# Patient Record
Sex: MALE
[De-identification: names, ages, dates, MRNs, and addresses within clinical notes are randomized per-mention and may not be internally consistent; named-entity substitution may affect disease eponyms.]

---

## 2017-07-11 PROBLEM — Z00.00 ENCOUNTER FOR PREVENTIVE HEALTH EXAMINATION: Status: ACTIVE | Noted: 2017-07-11

## 2017-08-02 ENCOUNTER — APPOINTMENT (OUTPATIENT)
Dept: GASTROENTEROLOGY | Facility: CLINIC | Age: 37
End: 2017-08-02

## 2018-01-19 ENCOUNTER — TRANSCRIPTION ENCOUNTER (OUTPATIENT)
Age: 38
End: 2018-01-19

## 2019-12-28 ENCOUNTER — OFFICE VISIT (OUTPATIENT)
Dept: SPORTS MEDICINE | Facility: CLINIC | Age: 39
End: 2019-12-28
Payer: COMMERCIAL

## 2019-12-28 VITALS
BODY MASS INDEX: 23.62 KG/M2 | SYSTOLIC BLOOD PRESSURE: 117 MMHG | WEIGHT: 190 LBS | DIASTOLIC BLOOD PRESSURE: 77 MMHG | HEIGHT: 75 IN

## 2019-12-28 DIAGNOSIS — M99.05 SOMATIC DYSFUNCTION OF PELVIC REGION: ICD-10-CM

## 2019-12-28 DIAGNOSIS — M54.59 MECHANICAL LOW BACK PAIN: Primary | ICD-10-CM

## 2019-12-28 DIAGNOSIS — M79.10 MYALGIA: ICD-10-CM

## 2019-12-28 DIAGNOSIS — M99.06 SOMATIC DYSFUNCTION OF LOWER EXTREMITY: ICD-10-CM

## 2019-12-28 DIAGNOSIS — M99.04 SACRAL REGION SOMATIC DYSFUNCTION: ICD-10-CM

## 2019-12-28 DIAGNOSIS — M99.03 SOMATIC DYSFUNCTION OF LUMBAR REGION: ICD-10-CM

## 2019-12-28 DIAGNOSIS — R20.2 PARESTHESIA OF BOTH LOWER EXTREMITIES: ICD-10-CM

## 2019-12-28 DIAGNOSIS — M99.02 SOMATIC DYSFUNCTION OF THORACIC REGION: ICD-10-CM

## 2019-12-28 PROCEDURE — 99204 PR OFFICE/OUTPT VISIT, NEW, LEVL IV, 45-59 MIN: ICD-10-PCS | Mod: 25,S$GLB,, | Performed by: NEUROMUSCULOSKELETAL MEDICINE & OMM

## 2019-12-28 PROCEDURE — 97110 THERAPEUTIC EXERCISES: CPT | Mod: S$GLB,,, | Performed by: NEUROMUSCULOSKELETAL MEDICINE & OMM

## 2019-12-28 PROCEDURE — 98927 PR OSTEOPATHIC MANIP,5-6 BODY REGN: ICD-10-PCS | Mod: S$GLB,,, | Performed by: NEUROMUSCULOSKELETAL MEDICINE & OMM

## 2019-12-28 PROCEDURE — 99999 PR PBB SHADOW E&M-NEW PATIENT-LVL II: CPT | Mod: PBBFAC,,, | Performed by: NEUROMUSCULOSKELETAL MEDICINE & OMM

## 2019-12-28 PROCEDURE — 99999 PR PBB SHADOW E&M-NEW PATIENT-LVL II: ICD-10-PCS | Mod: PBBFAC,,, | Performed by: NEUROMUSCULOSKELETAL MEDICINE & OMM

## 2019-12-28 PROCEDURE — 97110 PR THERAPEUTIC EXERCISES: ICD-10-PCS | Mod: S$GLB,,, | Performed by: NEUROMUSCULOSKELETAL MEDICINE & OMM

## 2019-12-28 PROCEDURE — 99204 OFFICE O/P NEW MOD 45 MIN: CPT | Mod: 25,S$GLB,, | Performed by: NEUROMUSCULOSKELETAL MEDICINE & OMM

## 2019-12-28 PROCEDURE — 98927 OSTEOPATH MANJ 5-6 REGIONS: CPT | Mod: S$GLB,,, | Performed by: NEUROMUSCULOSKELETAL MEDICINE & OMM

## 2019-12-28 NOTE — PROGRESS NOTES
Subjective:     Stephon Jackson     Chief Complaint   Patient presents with    Spine - Pain       HPI    Stephon is a 39 y.o. male coming in today for low back pain that began 10 day(s) ago, referred by self. Pt. describes the pain as a 3/10 achy pain that does radiate. There was not a fall/injury/ or trauma associated with the onset of symptoms. Pt. was attending a group exercise class and tried to jump in to difficult exercises. Pt then reports he was picking up grocey bags and and increased pain over left side lower back. He has a history of 2 herniated disks in 2015 and has been to PT in the pat with some relief. The pain is better with PT, rest and worse with sitting, driving.  Patient also notes having a EMG in 2017 noting a possible polyneuropathy, but this has been stable without any progression of symptoms. Patient notes intermittent tingling in his lower extremities, but none currently.  Pt. Denies any other musculoskeletal complaints at this time.      Joint instability? no  Mechanical locking/clicking? no   Affecting ADL's? yes  Affecting sleep? no      Review of Systems   Constitutional: Negative for chills and fever.   HENT: Negative for hearing loss and tinnitus.    Eyes: Negative for blurred vision and photophobia.   Respiratory: Negative for cough and shortness of breath.    Cardiovascular: Negative for chest pain and leg swelling.   Gastrointestinal: Negative for abdominal pain, heartburn, nausea and vomiting.   Genitourinary: Negative for dysuria and hematuria.   Musculoskeletal: Negative for back pain, falls, joint pain, myalgias and neck pain.   Skin: Negative for rash.   Neurological: Positive for tingling. Negative for dizziness, focal weakness, weakness and headaches.   Endo/Heme/Allergies: Negative for environmental allergies. Does not bruise/bleed easily.   Psychiatric/Behavioral: Negative for depression. The patient is not nervous/anxious.        PAST MEDICAL HISTORY:   Past Medical  History:   Diagnosis Date    Crohn's disease     in remission since 2004    Paresthesia      PAST SURGICAL HISTORY: History reviewed. No pertinent surgical history.  FAMILY HISTORY: History reviewed. No pertinent family history.  SOCIAL HISTORY:   Social History     Socioeconomic History    Marital status: Single     Spouse name: Not on file    Number of children: Not on file    Years of education: Not on file    Highest education level: Not on file   Occupational History    Not on file   Social Needs    Financial resource strain: Not on file    Food insecurity:     Worry: Not on file     Inability: Not on file    Transportation needs:     Medical: Not on file     Non-medical: Not on file   Tobacco Use    Smoking status: Not on file   Substance and Sexual Activity    Alcohol use: Not on file    Drug use: Not on file    Sexual activity: Not on file   Lifestyle    Physical activity:     Days per week: Not on file     Minutes per session: Not on file    Stress: Not on file   Relationships    Social connections:     Talks on phone: Not on file     Gets together: Not on file     Attends [employer] service: Not on file     Active member of club or organization: Not on file     Attends meetings of clubs or organizations: Not on file     Relationship status: Not on file   Other Topics Concern    Not on file   Social History Narrative    Not on file       MEDICATIONS: No current outpatient medications on file.  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     Office note from Dr. Augustin (ortho spine surgery in [place], [place]) on 2/13/17 reviewed:  Noted previous treatment included acupuncture, chiropractic manipulation, and physical therapy.  Lumbar spine MRI from  "2/11/17 was noted to have L3-4 and L4-5 desiccation and disc space narrowing with a L3-4 bulge and midline annular tear. The L4-5 mild bulge appeared stable in comparison to September 2016 lumbar spine MRI, per Dr. Augustin's note.  No apparent nerve root compression or significant stenosis was appreciated. Physical therapy, EMG, and neurology consultation was ordered.     Objective:     VITAL SIGNS: /77 (BP Location: Right arm, Patient Position: Sitting, BP Method: Medium (Automatic))   Ht 6' 3" (1.905 m)   Wt 86.2 kg (190 lb)   BMI 23.75 kg/m²    General    Nursing note and vitals reviewed.  Constitutional: He is oriented to person, place, and time. He appears well-developed and well-nourished.   HENT:   Head: Normocephalic and atraumatic.   no nasal discharge, no external ear redness or discharge   Eyes:   EOM is full and smooth  No eye redness or discharge   Neck: Neck supple. No tracheal deviation present.   Cardiovascular: Normal rate.    2+ Radial pulse bilaterally  2+ Dorsalis Pedis pulse bilaterally  No LE edema appreciated   Pulmonary/Chest: Effort normal. No respiratory distress.   Abdominal: He exhibits no distension.   No rigidity   Neurological: He is alert and oriented to person, place, and time. He exhibits normal muscle tone. Coordination normal.   See details below   Psychiatric: He has a normal mood and affect. His behavior is normal.               MUSCULOSKELETAL EXAM  Lumbar Spine: bilateral lumbar region    Observation:    Normal cervicothoracolumbar curves.    No obvious pelvic obliquity while standing.    No edema, erythema, or ecchymosis noted in lumbosacral region.    No midline skin abnormalities.    No atrophy of lower limb musculature.  Leg lengths symmetric.  Posture:  Posterior pelvis tilt with loss of lumbar lordosis     Tenderness:  No tenderness throughout the lumbar spine, iliolumbar region, posterior pelvis.  No tenderness over the sacrum, piriformis, greater/lesser " trochanters.  No bony deformities or step-offs palpated.     Range of Motion (* = with pain):  Active flexion to 60°.  Active extension to 25°.   Active rotation to 30° on left and 30° on right.  Active sidebending to 25° on left and 25° on right.   Passive hip flexion to 135° on left and 135° on right.    Passive hip internal rotation to 45° on left and 45° on right.   Passive hip external rotation to 45° on left and 45° on right.   All ROM testing is without pain.    Strength Testing (* = with pain):  Hip flexion - 5/5 on left and 5/5 on right  Hip extension - 5/5 on left and 5/5 on right  Knee flexion - 5/5 on left and 5/5 on right  Knee extension - 5/5 on left and 5/5 on right  Dorsiflexion - 5/5 on left and 5/5 on right  Plantarflexion - 5/5 on left and 5/5 on right  Great toe extension - 5/5 on left and 5/5 on right    Special Tests:  Stork test - negative on left and negative on right  Resisted axial rotation - negative on left and negative on right    Seated straight leg raise - negative on left and negative on right  Supine straight leg raise - negative on left and negative on right   Slump test - negative on left and negative on right  Pheasant test - negative on left and negative on right  Provocation maneuvers exhibit no worsening of symptoms.    ELA test - negative  FADIR test - negative    Structural Exam:  TART (Tissue texture abnormality, Asymmetry,  Restriction of motion and/or Tenderness) changes:       Thoracic Spine   T1 Neutral   T2 Neutral   T3 Neutral   T4 Neutral   T5 Neutral   T6 Neutral   T7 Neutral   T8 Neutral   T9 Neutral   T10 Neutral   T11 Neutral   T12 NS-right,R-left     Rib cage: neutral    Abdomen: left hemidiaphragm TTA     Lumbar Spine   L1 NS-right,R-left   L2 NS-right,R-left   L3 NS-right,R-left   L4 Neutral   L5 FRS RIGHT       Pelvis:  · Innominate:Right anterior rotation  · Pubic bone:Right inferior pubic shear    Sacrum:Left on Right sacral torsion     Lower  Extremity:  · Leg lengths symmetric    Location/joint Finding/restriction   Fibular head Posterior on left   [place] Neutral   Talocrural joint Left   Subtalar Joint Neutral   Cuboid Neutral   Talo-navicular joint Neutral   Navicular-cuneiform joint Neutral       Key   F= Flexed   E = Extended   R = Rotated   S = Sidebent   TTA = tissue texture abnormality       Neurovascular Exam:  Reflexes +2/4 and symmetric at the L4 and S1 dermatomes.    Sensation intact to light touch in the L2-S2 dermatomes bilaterally.   Babinski response downgoing bilaterally.  No pretibial edema or abnormal hair pattern of the shin.    Intact and symmetric DP and PT pulses bilaterally.  Normal gait without trendelenberg, heel walking, toe walking, and tandem walking.      Assessment:      Encounter Diagnoses   Name Primary?    Mechanical low back pain Yes    Myalgia     Paresthesia of both lower extremities     Somatic dysfunction of thoracic region     Somatic dysfunction of lumbar region     Sacral region somatic dysfunction     Somatic dysfunction of pelvic region     Somatic dysfunction of lower extremity           Plan:   1.  Low back pain likely secondary to biomechanical restrictions of the lower kinetic chain and compensatory firing patterns.  No radicular symptoms on physical exam today.   - OMT performed today to address associated biomechanical restrictions  and HEP started.   - Recommend continuing activity as tolerated in current yoga practice, starting increased core strengthening with Pilates and personal training.  Information given to patient on [facility]  - Per notes of previous spinal orthopedic surgeon in [facility], [place] (Dr. Augustin) noted L3-4 and L4-5 disc degeneration from MRI on 2/11/17 without any clear nerve root compression or significant stenosis.     2. Underlying, intermittent lower extremity paresthesias with previous EMG in 2017 noting a possible multifocal demyelinating  polyneuropathy.  Plan for continued watchful waiting, but if symptoms progress recommend referral to Neurology for further evaluation.     3. OMT 5-6 regions. Oral consent obtained.  Reviewed benefits and potential side effects.   - OMT indicated today due to signs and symptoms as well as local and remote somatic dysfunction findings and their related neurokinetic, lymphatic, fascial and/or arteriovenous body connections.   - OMT techniques used: Myofascial Release, Muscle Energy and Articulatory   - Treatment was tolerated well. Improvement noted in segmental mobility post-treatment in dysfunctional regions. There were no adverse events and no complications immediately following treatment.     4. Pt. Given the following HEP:  A) Diaphragmatic breathing exercises: 10-15 reps of inhalation and exhalation, focusing inhaling into the abdomen and lower ribs. Repeat 2-3 times a day. Handout given.   B)  Pelvic clock exercises given to do from the 6-12 o'clock positions:10-15 reps, twice daily. Hand out of exercise also given.   C) Quadratus lumborum self-stretch on all fours: hold stretch for 30 seconds, repeating 2-3 times on each side. Do stretch twice daily. Hand-out also given.   D) IT band rolling: recommend using rolling stick or foam roller to roll out IT band tension bilaterally, 1-2 times a day.     [address]84 HOME EXERCISE PROGRAM (HEP):  The patient was taught a homegoing physical therapy regimen as described above. The patient demonstrated understanding of the exercises and proper technique of their execution. This interaction took 15 minutes.     5. Follow-up in 2-3 weeks for reevaluation    6. Patient agreeable to today's plan and all questions were answered    This note is dictated using the M*Modal Fluency Direct word recognition program. There are word recognition mistakes that are occasionally missed on review.

## 2020-01-13 ENCOUNTER — OFFICE VISIT (OUTPATIENT)
Dept: ORTHOPEDICS | Facility: CLINIC | Age: 40
End: 2020-01-13
Payer: COMMERCIAL

## 2020-01-13 VITALS
HEIGHT: 75 IN | SYSTOLIC BLOOD PRESSURE: 110 MMHG | WEIGHT: 190 LBS | BODY MASS INDEX: 23.62 KG/M2 | DIASTOLIC BLOOD PRESSURE: 70 MMHG

## 2020-01-13 DIAGNOSIS — R20.2 PARESTHESIA OF BOTH LOWER EXTREMITIES: ICD-10-CM

## 2020-01-13 DIAGNOSIS — M99.05 SOMATIC DYSFUNCTION OF PELVIC REGION: ICD-10-CM

## 2020-01-13 DIAGNOSIS — M99.03 SOMATIC DYSFUNCTION OF LUMBAR REGION: ICD-10-CM

## 2020-01-13 DIAGNOSIS — M79.10 MYALGIA: ICD-10-CM

## 2020-01-13 DIAGNOSIS — M99.06 SOMATIC DYSFUNCTION OF LOWER EXTREMITY: ICD-10-CM

## 2020-01-13 DIAGNOSIS — M54.59 MECHANICAL LOW BACK PAIN: Primary | ICD-10-CM

## 2020-01-13 DIAGNOSIS — M99.04 SACRAL REGION SOMATIC DYSFUNCTION: ICD-10-CM

## 2020-01-13 DIAGNOSIS — M99.02 SOMATIC DYSFUNCTION OF THORACIC REGION: ICD-10-CM

## 2020-01-13 PROCEDURE — 99999 PR PBB SHADOW E&M-EST. PATIENT-LVL II: CPT | Mod: PBBFAC,,, | Performed by: NEUROMUSCULOSKELETAL MEDICINE & OMM

## 2020-01-13 PROCEDURE — 99999 PR PBB SHADOW E&M-EST. PATIENT-LVL II: ICD-10-PCS | Mod: PBBFAC,,, | Performed by: NEUROMUSCULOSKELETAL MEDICINE & OMM

## 2020-01-13 PROCEDURE — 98927 OSTEOPATH MANJ 5-6 REGIONS: CPT | Mod: S$GLB,,, | Performed by: NEUROMUSCULOSKELETAL MEDICINE & OMM

## 2020-01-13 PROCEDURE — 99213 OFFICE O/P EST LOW 20 MIN: CPT | Mod: 25,S$GLB,, | Performed by: NEUROMUSCULOSKELETAL MEDICINE & OMM

## 2020-01-13 PROCEDURE — 98927 PR OSTEOPATHIC MANIP,5-6 BODY REGN: ICD-10-PCS | Mod: S$GLB,,, | Performed by: NEUROMUSCULOSKELETAL MEDICINE & OMM

## 2020-01-13 PROCEDURE — 99213 PR OFFICE/OUTPT VISIT, EST, LEVL III, 20-29 MIN: ICD-10-PCS | Mod: 25,S$GLB,, | Performed by: NEUROMUSCULOSKELETAL MEDICINE & OMM

## 2020-01-13 NOTE — PROGRESS NOTES
"Subjective:     Stephon Jackson     Chief Complaint   Patient presents with    Follow-up     Back pain       HPI    Stephon is a 39 y.o. male coming in today for low back pain. Since last visit the pain has Improved. He states he feels much better than 2 weeks ago. He had one session with [facility] which went well and denies any pain with this. The pain is better with HEP, OMT and worse with first thing in the morning. Pt. describes the pain as a 0/10 currently,  4/10 at worst stiff pain that does not radiate. He continues to c/o numbness in his left foot with working out and sitting with leg crossed. He notes stiffness in his left lower back. There has not been any new a fall/injury/ or traumas since last visit.  Pt. denies any new musculoskeletal complaints at this time.     Office note from 12/28/19 reviewed    Review of Systems   Constitutional: Negative for chills and fever.   Musculoskeletal: Negative for back pain, falls, joint pain, myalgias and neck pain.   Neurological: Positive for tingling (L foot). Negative for dizziness, focal weakness, weakness and headaches.       PAST MEDICAL HISTORY:   Past Medical History:   Diagnosis Date    Crohn's disease     in remission since 2004    Paresthesia      PAST SURGICAL HISTORY: History reviewed. No pertinent surgical history.    MEDICATIONS: No current outpatient medications on file.  ALLERGIES: Review of patient's allergies indicates:  No Known Allergies      Objective:     VITAL SIGNS: /70   Ht 6' 3" (1.905 m)   Wt 86.2 kg (190 lb)   BMI 23.75 kg/m²    General    Vitals reviewed.  Constitutional: He is oriented to person, place, and time. He appears well-developed and well-nourished.   Neurological: He is alert and oriented to person, place, and time.   Psychiatric: He has a normal mood and affect. His behavior is normal.               MUSCULOSKELETAL EXAM  Lumbar Spine: bilateral lumbar region     Observation:    Normal cervicothoracolumbar " curves.    No obvious pelvic obliquity while standing.    No edema, erythema, or ecchymosis noted in lumbosacral region.    No midline skin abnormalities.    No atrophy of lower limb musculature.  Leg lengths symmetric.  Posture:  Posterior pelvis tilt with loss of lumbar lordosis   + left hip hiking compensatory pattern noted with right single leg raise    Tenderness:  No tenderness throughout the lumbar spine, iliolumbar region, posterior pelvis.  No tenderness over the sacrum, piriformis, greater/lesser trochanters.  No bony deformities or step-offs palpated.      Range of Motion (* = with pain):  Active flexion to 60°.  Active extension to 25°.   Active rotation to 30° on left and 30° on right.  Active sidebending to 25° on left and 25° on right.   Passive hip flexion to 135° on left and 135° on right.    Passive hip internal rotation to 45° on left and 45° on right.   Passive hip external rotation to 45° on left and 45° on right.   All ROM testing is without pain.     Strength Testing (* = with pain):  Hip flexion - 5/5 on left and 5/5 on right  Hip extension - 5/5 on left and 5/5 on right  Knee flexion - 5/5 on left and 5/5 on right  Knee extension - 5/5 on left and 5/5 on right  Dorsiflexion - 5/5 on left and 5/5 on right  Plantarflexion - 5/5 on left and 5/5 on right  Great toe extension - 5/5 on left and 5/5 on right     Special Tests:  Stork test - negative on left and negative on right  Resisted axial rotation - negative on left and negative on right     Seated straight leg raise - negative on left and negative on right  Supine straight leg raise - negative on left and negative on right   Slump test - negative on left and negative on right  Pheasant test - negative on left and negative on right  Provocation maneuvers exhibit no worsening of symptoms.     ELA test - negative  FADIR test - negative    TART (Tissue texture abnormality, Asymmetry,  Restriction of motion and/or Tenderness) changes:      Thoracic Spine   T1 Neutral   T2 Neutral   T3 Neutral   T4 Neutral   T5 Neutral   T6 Neutral   T7 Neutral   T8 Neutral   T9 Neutral   T10 Neutral   T11 NS-right,R-left   T12 NS-right,R-left     Rib cage: Neutral     Lumbar Spine   L1 NS-right,R-left   L2 NS-right,R-left   L3 ERS LEFT   L4 FRS RIGHT   L5 FRS RIGHT       Pelvis:  · Innominate:Right anterior rotation  · Pubic bone:Right inferior pubic shear    Sacrum:Left on Right sacral torsion     Lower extremity: Left talus anterior    Key   F= Flexed   E = Extended   R = Rotated   S = Sidebent   TTA = tissue texture abnormality       Assessment:     Encounter Diagnoses   Name Primary?    Mechanical low back pain Yes    Myalgia     Paresthesia of both lower extremities     Somatic dysfunction of lumbar region     Sacral region somatic dysfunction     Somatic dysfunction of pelvic region     Somatic dysfunction of thoracic region     Somatic dysfunction of lower extremity        Plan:   1.  Low back pain likely secondary to biomechanical restrictions of the lower kinetic chain and compensatory firing patterns - improved  - OMT performed again today to address associated biomechanical restrictions  and HEP reviewed  - Recommend continuing activity as tolerated in current yoga practice, starting increased core strengthening with Pilates and personal training with [facility]  - Per notes of previous spinal orthopedic surgeon in [place], [place] (Dr. Augustin) noted L3-4 and L4-5 disc degeneration from MRI on 2/11/17 without any clear nerve root compression or significant stenosis.      2. Underlying, intermittent lower extremity paresthesias with previous EMG in 2017 noting a possible multifocal demyelinating polyneuropathy.  Plan for continued watchful waiting, but if symptoms progress recommend referral to PM&R for further evaluation.     3. OMT 5-6 regions. Oral consent obtained.  Reviewed benefits and potential side effects.   - OMT indicated  today due to signs and symptoms as well as local and remote somatic dysfunction findings and their related neurokinetic, lymphatic, fascial and/or arteriovenous body connections.   - OMT techniques used: Myofascial Release, Muscle Energy and Articulatory   - Treatment was tolerated well. Improvement noted in segmental mobility post-treatment in dysfunctional regions. There were no adverse events and no complications immediately following treatment.     4.  Reviewed with patient the following HEP:  Continue:  A) Diaphragmatic breathing exercises: 10-15 reps of inhalation and exhalation, focusing inhaling into the abdomen and lower ribs. Repeat 2-3 times a day. Handout given.   B)  Pelvic clock exercises given to do from the 6-12 o'clock positions:10-15 reps, twice daily. Hand out of exercise also given.   C) Quadratus lumborum self-stretch on all fours: hold stretch for 30 seconds, repeating 2-3 times on each side. Do stretch twice daily. Hand-out also given.   D) IT band rolling: recommend using rolling stick or foam roller to roll out IT band tension bilaterally, 1-2 times a day.      The patient was taught a homegoing physical therapy regimen as described above. The patient demonstrated understanding of the exercises and proper technique of their execution.     5. Follow-up in as needed if pain deteriorates or new issue arises    6. Patient agreeable to today's plan and all questions were answered    This note is dictated using the M*Modal Fluency Direct word recognition program. There are word recognition mistakes that are occasionally missed on review.

## 2021-03-30 ENCOUNTER — CLINICAL SUPPORT (OUTPATIENT)
Dept: URGENT CARE | Facility: CLINIC | Age: 41
End: 2021-03-30
Payer: COMMERCIAL

## 2021-03-30 DIAGNOSIS — Z20.822 ENCOUNTER FOR LABORATORY TESTING FOR COVID-19 VIRUS: ICD-10-CM

## 2021-03-30 PROCEDURE — U0005 INFEC AGEN DETEC AMPLI PROBE: HCPCS | Performed by: STUDENT IN AN ORGANIZED HEALTH CARE EDUCATION/TRAINING PROGRAM

## 2021-03-30 PROCEDURE — 99211 OFF/OP EST MAY X REQ PHY/QHP: CPT | Mod: S$GLB,,, | Performed by: STUDENT IN AN ORGANIZED HEALTH CARE EDUCATION/TRAINING PROGRAM

## 2021-03-30 PROCEDURE — U0003 INFECTIOUS AGENT DETECTION BY NUCLEIC ACID (DNA OR RNA); SEVERE ACUTE RESPIRATORY SYNDROME CORONAVIRUS 2 (SARS-COV-2) (CORONAVIRUS DISEASE [COVID-19]), AMPLIFIED PROBE TECHNIQUE, MAKING USE OF HIGH THROUGHPUT TECHNOLOGIES AS DESCRIBED BY CMS-2020-01-R: HCPCS | Performed by: STUDENT IN AN ORGANIZED HEALTH CARE EDUCATION/TRAINING PROGRAM

## 2021-03-30 PROCEDURE — 99211 PR OFFICE/OUTPT VISIT, EST, LEVL I: ICD-10-PCS | Mod: S$GLB,,, | Performed by: STUDENT IN AN ORGANIZED HEALTH CARE EDUCATION/TRAINING PROGRAM

## 2021-03-31 LAB — SARS-COV-2 RNA RESP QL NAA+PROBE: NOT DETECTED

## 2023-07-14 ENCOUNTER — CLINICAL SUPPORT (OUTPATIENT)
Dept: LAB | Facility: HOSPITAL | Age: 43
End: 2023-07-14
Payer: COMMERCIAL

## 2023-07-14 DIAGNOSIS — R00.2 PALPITATIONS: ICD-10-CM

## 2023-07-14 DIAGNOSIS — R00.2 PALPITATIONS: Primary | ICD-10-CM

## 2023-07-14 PROCEDURE — 93010 EKG 12-LEAD: ICD-10-PCS | Mod: ,,, | Performed by: INTERNAL MEDICINE

## 2023-07-14 PROCEDURE — 93010 ELECTROCARDIOGRAM REPORT: CPT | Mod: ,,, | Performed by: INTERNAL MEDICINE

## 2023-07-14 PROCEDURE — 93005 ELECTROCARDIOGRAM TRACING: CPT

## 2024-05-23 ENCOUNTER — PATIENT MESSAGE (OUTPATIENT)
Dept: ENDOCRINOLOGY | Facility: CLINIC | Age: 44
End: 2024-05-23

## 2024-05-23 ENCOUNTER — OFFICE VISIT (OUTPATIENT)
Dept: ENDOCRINOLOGY | Facility: CLINIC | Age: 44
End: 2024-05-23
Payer: COMMERCIAL

## 2024-05-23 DIAGNOSIS — E03.8 OTHER SPECIFIED HYPOTHYROIDISM: Primary | ICD-10-CM

## 2024-05-23 PROCEDURE — 99214 OFFICE O/P EST MOD 30 MIN: CPT | Mod: 95,,, | Performed by: STUDENT IN AN ORGANIZED HEALTH CARE EDUCATION/TRAINING PROGRAM

## 2024-05-23 NOTE — ASSESSMENT & PLAN NOTE
Patient likely had thyroiditis last year around June with biochemical and clinical hyperthyroidism.  Then this resolved into hypothyroidism. He is taking 25 mcg levothyroxine which is not covering his needs.  His ultrasound likely showed pseudonodules during thyroiditis with subsequent resolution but will reorder given pt is having symptoms.    -US thyroid dept imaging  -Increase LT4 to 75 mcg  -Repeat TSH add TPO in 4-6 weeks

## 2024-05-23 NOTE — PROGRESS NOTES
I have reviewed and concur with Dr. Ana Luisa Frederick's history, physical, assessment, and plan.  I have personally interviewed and examined the patient.  See below addendum for my evaluation and additional findings.    History is consistent with thyroiditis with residual hypothyroidism.  Initial ultrasound likely showed pseudo nodule with resolution on repeat imaging.  As have hypothyroid symptoms with mildly elevated TSH increase levothyroxine with repeat TSH in 2 months which time we will also check TPO antibodies to better understand cause of hypothyroidism resolving.  Jesusita to have discomfort anterior neck will update neck ultrasound in our system.    Fran Boland MD

## 2024-05-23 NOTE — PROGRESS NOTES
ENDOCRINOLOGY NEW PATIENT VISIT: 05/23/2024  The patient location is: [place]  The chief complaint leading to consultation is: No chief complaint on file.      Visit type: audiovisual    Face to Face time with patient: 35 minutes  45 minutes of total time spent on the encounter, which includes face to face time and non-face to face time preparing to see the patient (eg, review of tests), Obtaining and/or reviewing separately obtained history, Documenting clinical information in the electronic or other health record, Independently interpreting results (not separately reported) and communicating results to the patient/family/caregiver, or Care coordination (not separately reported).    Each patient to whom he or she provides medical services by telemedicine is:  (1) informed of the relationship between the physician and patient and the respective role of any other health care provider with respect to management of the patient; and (2) notified that he or she may decline to receive medical services by telemedicine and may withdraw from such care at any time.    Subjective:      Patient ID: Stephon Jackson is a 43 y.o. male.    Chief Complaint:  Hypothyroidism    History of Present Illness  43-year-old man with no significant past medical history comes to our clinic to discuss hypothyroidism.    Around 2021 he started noticing fatigue, weight gain of 25 lb; at that time TSH was elevated, but we don't have the results.    He [place] seeing [place]. Chandler [place] [facility] [place].    In July 2023 he started having neck pain and vocal changes - TSH was variable across measurements from undetectable to high. He has had palpitations during that time but no other consistent hyperthyroid symptoms.  An ultrasound at that time revealed concern for a right thyroid nodule, uptake and scan was done which was completely normal.  Repeat ultrasound in November showed no nodules.    Started on 25 mcg of levothyroxine in Nov 2023 - TSH went from 7.5 to  4. He felt alright until May 2024, when he started having neck pain, fatigue, night sweats.      Thyroid ultrasound  Jul/2023  Right lobe: 5.2 x 1.3 x 1.2 cm. Left lobe: 4.8 x 1.5 x 1.5 cm. Isthmus: 0.3 cm AP dimension   The right lobe has homogeneous parenchymal echotexture. Adjacent hypoechoic oval solid circumscribed nodules in the mid right lobe measure 0.3 x 0.2 x 0.2 cm and 0.7 x 0.8 x 0.9 cm. .   The left lobe has homogeneous parenchymal echotexture. The left lobe has an oval hypoechoic solid nodule with indistinct margins in the mid lobe, measuring 2.3 x 1.1 x 0.8 centimeters. It is relatively avascular. .     IMPRESSION 1. Normal sized thyroid gland. 2. Sub-centimeter oval hypoechoic solid nodules right lobe. TI-RADS 4. Nodules are below size threshold for follow-up. 3. 2.3 x 1.1 x 0.84 centimeter oval hypoechoic solid nodule with indistinct margins in the mid lobe of the left lobe of the thyroid. TI-RADS 4. Based on a maximum dimension of the nodule, the criteria for fine needle aspiration/tissue sampling are met.     Nov 2023  The RIGHT lobe measures 5.5 x 1.1 x 0.8cm.  The LEFT lobe measures 5.4 x 1.4 x 1cm.  The ISTHMUS measures 2mm.  No nodules are detected. The low level echo findings concerning for a nodule in the right lobe as well as in the interpolar region of the left lobe have resolved are likely a consequence of inflammation.      [facility] labs from May 2024:  TSH 4.70   Cortisol 8 AM 6.6  ACTH 18    Mother, two uncles and cousins have hypothyroidism.    ROS:   As above    Objective:     There were no vitals taken for this visit.  BP Readings from Last 3 Encounters:   01/13/20 110/70   12/28/19 117/77     Wt Readings from Last 1 Encounters:   01/13/20 1336 86.2 kg (190 lb)     There is no height or weight on file to calculate BMI.      Physical Exam not performed - virtual visit       Assessment and Plan     Other specified hypothyroidism  Patient likely had thyroiditis last year around June with  biochemical and clinical hyperthyroidism.  Then this resolved into hypothyroidism. He is taking 25 mcg levothyroxine which is not covering his needs.  His ultrasound likely showed pseudonodules during thyroiditis with subsequent resolution but will reorder given pt is having symptoms.    -US thyroid dept imaging  -Increase LT4 to 75 mcg  -Repeat TSH add TPO in 4-6 weeks      Ana Luisa Reyes MD  [facility], 6th Floor  [address] [place], [address]    Office: [phone]  Fax: [phone]

## 2024-07-10 ENCOUNTER — HOSPITAL ENCOUNTER (OUTPATIENT)
Dept: ENDOCRINOLOGY | Facility: CLINIC | Age: 44
Discharge: HOME | End: 2024-07-10
Attending: STUDENT IN AN ORGANIZED HEALTH CARE EDUCATION/TRAINING PROGRAM
Payer: COMMERCIAL

## 2024-07-10 ENCOUNTER — LAB VISIT (OUTPATIENT)
Dept: LAB | Facility: HOSPITAL | Age: 44
End: 2024-07-10
Payer: COMMERCIAL

## 2024-07-10 DIAGNOSIS — E03.8 OTHER SPECIFIED HYPOTHYROIDISM: Primary | ICD-10-CM

## 2024-07-10 DIAGNOSIS — E03.8 OTHER SPECIFIED HYPOTHYROIDISM: ICD-10-CM

## 2024-07-10 LAB
THYROPEROXIDASE IGG SERPL-ACNC: <6 IU/ML
TSH SERPL DL<=0.005 MIU/L-ACNC: 1.39 UIU/ML (ref 0.4–4)

## 2024-07-10 PROCEDURE — 86376 MICROSOMAL ANTIBODY EACH: CPT | Performed by: STUDENT IN AN ORGANIZED HEALTH CARE EDUCATION/TRAINING PROGRAM

## 2024-07-10 PROCEDURE — 84443 ASSAY THYROID STIM HORMONE: CPT | Performed by: STUDENT IN AN ORGANIZED HEALTH CARE EDUCATION/TRAINING PROGRAM

## 2024-07-10 PROCEDURE — 76536 US EXAM OF HEAD AND NECK: CPT | Mod: S$GLB,,, | Performed by: INTERNAL MEDICINE

## 2024-07-10 PROCEDURE — 36415 COLL VENOUS BLD VENIPUNCTURE: CPT | Performed by: STUDENT IN AN ORGANIZED HEALTH CARE EDUCATION/TRAINING PROGRAM

## 2024-08-12 ENCOUNTER — OFFICE VISIT (OUTPATIENT)
Dept: ENDOCRINOLOGY | Facility: CLINIC | Age: 44
End: 2024-08-12
Payer: COMMERCIAL

## 2024-08-12 DIAGNOSIS — E03.8 OTHER SPECIFIED HYPOTHYROIDISM: Primary | ICD-10-CM

## 2024-08-12 PROCEDURE — G2211 COMPLEX E/M VISIT ADD ON: HCPCS | Mod: 95,,, | Performed by: NURSE PRACTITIONER

## 2024-08-12 PROCEDURE — 1160F RVW MEDS BY RX/DR IN RCRD: CPT | Mod: CPTII,95,, | Performed by: NURSE PRACTITIONER

## 2024-08-12 PROCEDURE — 1159F MED LIST DOCD IN RCRD: CPT | Mod: CPTII,95,, | Performed by: NURSE PRACTITIONER

## 2024-08-12 PROCEDURE — 99214 OFFICE O/P EST MOD 30 MIN: CPT | Mod: 95,,, | Performed by: NURSE PRACTITIONER

## 2024-08-12 RX ORDER — LEVOTHYROXINE SODIUM 75 UG/1
75 TABLET ORAL
COMMUNITY
Start: 2024-07-10

## 2024-08-12 NOTE — PROGRESS NOTES
ENDOCRINOLOGY PATIENT VISIT: 08/12/2024  The patient location is: [place]  The chief complaint leading to consultation is: No chief complaint on file.    Visit type: audiovisual    Face to Face time with patient: 11 minutes  20 minutes of total time spent on the encounter, which includes face to face time and non-face to face time preparing to see the patient (eg, review of tests), Obtaining and/or reviewing separately obtained history, Documenting clinical information in the electronic or other health record, Independently interpreting results (not separately reported) and communicating results to the patient/family/caregiver, or Care coordination (not separately reported).    Each patient to whom he or she provides medical services by telemedicine is:  (1) informed of the relationship between the physician and patient and the respective role of any other health care provider with respect to management of the patient; and (2) notified that he or she may decline to receive medical services by telemedicine and may withdraw from such care at any time.    Subjective:      Patient ID: Stephon Jackson is a 44 y.o. male.    Chief Complaint:  Hypothyroidism    History of Present Illness  44 y.o. year-old man with no significant past medical history comes to our clinic to discuss hypothyroidism.    Around 2021 he started noticing fatigue, weight gain of 25 lb; at that time TSH was elevated, but we don't have the results.    [place] [facility] [place]    In July 2023 he started having neck pain and vocal changes - TSH was variable across measurements from undetectable to high. He has had palpitations during that time but no other consistent hyperthyroid symptoms.  An ultrasound at that time revealed concern for a right thyroid nodule, uptake and scan was done which was completely normal.  Repeat ultrasound in November showed no nodules.    Started on 25 mcg of levothyroxine in Nov 2023 - TSH went from 7.5 to  4. He felt alright until May 2024, when he started having neck pain, fatigue, night sweats.    We increased his LT4 to 75 mcg daily in May 2024     Today, denies hypo or hyperthyroidism symptoms.   States he feels great.   Thyroid ultrasound  Jul/2023  Right lobe: 5.2 x 1.3 x 1.2 cm. Left lobe: 4.8 x 1.5 x 1.5 cm. Isthmus: 0.3 cm AP dimension   The right lobe has homogeneous parenchymal echotexture. Adjacent hypoechoic oval solid circumscribed nodules in the mid right lobe measure 0.3 x 0.2 x 0.2 cm and 0.7 x 0.8 x 0.9 cm. .   The left lobe has homogeneous parenchymal echotexture. The left lobe has an oval hypoechoic solid nodule with indistinct margins in the mid lobe, measuring 2.3 x 1.1 x 0.8 centimeters. It is relatively avascular. .     IMPRESSION 1. Normal sized thyroid gland. 2. Sub-centimeter oval hypoechoic solid nodules right lobe. TI-RADS 4. Nodules are below size threshold for follow-up. 3. 2.3 x 1.1 x 0.84 centimeter oval hypoechoic solid nodule with indistinct margins in the mid lobe of the left lobe of the thyroid. TI-RADS 4. Based on a maximum dimension of the nodule, the criteria for fine needle aspiration/tissue sampling are met.     Nov 2023  The RIGHT lobe measures 5.5 x 1.1 x 0.8cm.  The LEFT lobe measures 5.4 x 1.4 x 1cm.  The ISTHMUS measures 2mm.  No nodules are detected. The low level echo findings concerning for a nodule in the right lobe as well as in the interpolar region of the left lobe have resolved are likely a consequence of inflammation.    US in July 2024   No nodules     [facility] labs from May 2024:  TSH 4.70   Cortisol 8 AM 6.6  ACTH 18    Mother, two uncles and cousins have hypothyroidism.    ROS:   As above    Objective:     There were no vitals taken for this visit.  BP Readings from Last 3 Encounters:   01/13/20 110/70   12/28/19 117/77     Wt Readings from Last 1 Encounters:   01/13/20 1336 86.2 kg (190 lb)     There is no height or weight on file to calculate  BMI.      Physical Exam not performed - virtual visit       Assessment and Plan     Other specified hypothyroidism  Patient likely had thyroiditis around June 2023 with biochemical and clinical hyperthyroidism.  Then this resolved into hypothyroidism.   His ultrasound likely showed pseudonodules during thyroiditis with subsequent resolution on US in July 2024    Last TSH at goal  Continue LT4  75 mcg  No symptoms of hypo or hyperthyroidism today  Check labs in 6 months, sooner for symptoms      Visit today included increased complexity associated with the care of episodic problems hypothyroidism addressed and managing longitudinal care of the patient due to serious managed problems hypothyroidism

## 2024-08-12 NOTE — ASSESSMENT & PLAN NOTE
Patient likely had thyroiditis around June 2023 with biochemical and clinical hyperthyroidism.  Then this resolved into hypothyroidism.   His ultrasound likely showed pseudonodules during thyroiditis with subsequent resolution on US in July 2024    Last TSH at goal  Continue LT4  75 mcg  No symptoms of hypo or hyperthyroidism today  Check labs in 6 months, sooner for symptoms